# Patient Record
Sex: FEMALE | Race: OTHER | URBAN - METROPOLITAN AREA
[De-identification: names, ages, dates, MRNs, and addresses within clinical notes are randomized per-mention and may not be internally consistent; named-entity substitution may affect disease eponyms.]

---

## 2017-05-18 ENCOUNTER — IMPORTED ENCOUNTER (OUTPATIENT)
Dept: URBAN - METROPOLITAN AREA CLINIC 9 | Facility: CLINIC | Age: 72
End: 2017-05-18

## 2017-07-28 NOTE — PATIENT DISCUSSION
Surgery Counseling:  I have discussed the option of glasses versus cataract surgery versus following, It was explained that when vision no longer meets the patient's visual needs and a new prescription for glasses is not likely to improve the patient's visual symptoms, the option of cataract surgery is a reasonable next step. It was explained that there is no guarantee that removing the cataract will improve their visual symptoms; however, it is believed that the cataract is contributing to the patient's visual impairment and surgery may noticeably improve both the visual and functional status of the patient. After this discussion, the patient desires to proceed with cataract surgery with implantation of an intraocular lens to improve their vision for driving and to reduce glare.

## 2017-07-28 NOTE — PATIENT DISCUSSION
CATARACT, OU - VISUALLY SIGNIFICANT. SCHEDULE PHACO WITH IOL OD SET FOR NEAR FIRST THEN OS SET FOR NEAR IF VISUAL SYMPTOMS PERSIST. GLASSES RX GIVEN TO FILL IF DESIRES IN THE EVENT PATIENT DOES NOT PROCEED WITH SURGERY. IF AXIAL LENGTH GREATER THAN 26MM WILL NEED RETINAL CLEARANCE. EXPLAINED INCREASED RISK OF RD WITH LONG AXIAL LENGTH.

## 2017-07-28 NOTE — PATIENT DISCUSSION
DIPLOPIA: SYMPTOMATIC FOR THIS PATIENT. CONTINUE TO WEAR PRISM RX AND TO SEE DR. JIMENEZ AS SCHEDULED.

## 2017-07-28 NOTE — PATIENT DISCUSSION
Diplopia or double vision is caused by misalignment of the eyes. This can be due to many causes. It is often treated with either prism glasses or eye muscle surgery. The alignment must be stable prior to proceeding with eye muscle surgery. The cause of diplopia was discussed with the patient and all their questions were answered.

## 2017-07-28 NOTE — PATIENT DISCUSSION
MILD DRY EYE, OU: CONTINUE ARTIFICIAL TEARS PRN OU. RECOMMENDS OMEGA-3 FISH OIL WITH [facility] APPROVAL. RETURN FOR FOLLOW-UP AS SCHEDULED OR SOONER IF SYMPTOMS WORSEN.

## 2017-08-25 NOTE — PATIENT DISCUSSION
New Prescription: Besivance (besifloxacin): drops,suspension: 0.6% 1 drop three times a day as directed into right eye 08-

## 2017-08-25 NOTE — PATIENT DISCUSSION
CATARACT, OU - VISUALLY SIGNIFICANT. SCHEDULE PHACO WITH IOL OD SET FOR NEAR FIRST THEN OS SET FOR NEAR IF VISUAL SYMPTOMS PERSIST.

## 2017-08-25 NOTE — PATIENT DISCUSSION
New Prescription: Prolensa (bromfenac): drops: 0.07% 1 drop once a day as directed into right eye 08-

## 2017-08-25 NOTE — PATIENT DISCUSSION
New Prescription: prednisolone acetate (prednisolone acetate): drops,suspension: 1% 1 drop three times a day as directed into right eye 08-

## 2017-09-20 NOTE — PATIENT DISCUSSION
Continue: Besivance (besifloxacin): drops,suspension: 0.6% 1 drop three times a day as directed into left eye 08-

## 2017-09-20 NOTE — PATIENT DISCUSSION
Continue: prednisolone acetate (prednisolone acetate): drops,suspension: 1% 1 drop three times a day as directed into both eyes 08-

## 2017-09-20 NOTE — PATIENT DISCUSSION
S/P PC IOL, OS: GOOD POST OP RESULT. STOP ANTIBIOTIC DROP IN ONE WEEK. CONTINUE OTHER DROPS AS DIRECTED UNTIL FURTHER INSTRUCTION. RETURN FOR FOLLOW-UP IN 2 WEEKS. GIVEN A TEMPORARY LENS FOR OS. UPDATE PRISM WHEN HE SEES DR. JIMENEZ IN APROX 2-3 WEEKS.

## 2017-10-05 NOTE — PATIENT DISCUSSION
Post-Op Instructions OS: Pred Forte (Prednisolone) 2 times per day for 1 week, then reduce to 1 time per day for 1 week, then discontinue. Prolensa (Bromfenac) 1 time per day for 2 weeks then discontinue.

## 2017-10-05 NOTE — PATIENT DISCUSSION
S/P PE IOL, OU:  DOING WELL. CONTINUE DROPS AS DIRECTED. SPECS RX OFFERED. RX ARC IN SPECS TO MINIMIZE GLARE. RETURN FOR FOLLOW-UP AS SCHEDULED. PATIENT HAS APPT WITH DR Evelina Hunter FOR PRISM RX IN NOVEMBER - NO DIPLOPIA NOTED TODAY, SO ADVISED HIM TO CANCEL APPT WITH DR. JIMENEZ IF ASYMPTOMATIC AFTER GETTING NEW GLASSES MADE.

## 2018-05-21 ENCOUNTER — IMPORTED ENCOUNTER (OUTPATIENT)
Dept: URBAN - METROPOLITAN AREA CLINIC 9 | Facility: CLINIC | Age: 73
End: 2018-05-21

## 2018-07-24 NOTE — PATIENT DISCUSSION
Epiretinal Membrane Counseling: The diagnosis and natural history of epiretinal membrane was discussed with the patient including the risk of progression with retinal traction resulting in visual distortion. Patient instructed on how to do [address] to check for distortion in vision, The patient is instructed to call back immediately if any vision changes, and keep follow up as scheduled.

## 2018-07-24 NOTE — PATIENT DISCUSSION
POSTERIOR CAPSULAR FIBROSIS, OU:  VISUALLY SIGNIFICANT. SCHEDULE YAG CAPSULOTOMY ODFIRST THEN LATER YAG CAPSULOTOMY OS IF VISUAL SYMPTOMS PERSIST.

## 2018-09-18 NOTE — PATIENT DISCUSSION
OD 1st / Symfony OD Nataliya / Symfony OS -0.50 to -0.75 /TMF / Custom Nataliya OU/ B + Nataliya OU.

## 2018-09-18 NOTE — PATIENT DISCUSSION
Start Valtrex 1 day before surgery and 9 days after surgery for each eye. Pt has Valtrex rx on hand.

## 2018-10-18 NOTE — PATIENT DISCUSSION
1 week PO: Patient is doing well post-operatively. The importance of post-op drop compliance was emphasized. Drop scheduled reviewed with patient. Patient to call if any visual changes or concerns. MD at bedside.

## 2019-05-23 ENCOUNTER — IMPORTED ENCOUNTER (OUTPATIENT)
Dept: URBAN - METROPOLITAN AREA CLINIC 9 | Facility: CLINIC | Age: 74
End: 2019-05-23

## 2020-03-06 NOTE — PATIENT DISCUSSION
Epiretinal Membrane Counseling: The diagnosis and natural history of epiretinal membrane was discussed with the patient including the risk of progression with retinal traction resulting in visual distortion. Patient instructed on how to do [address] to check for distortion in vision, The patient is instructed to call back immediately if any vision changes, and keep follow up as scheduled.

## 2020-03-06 NOTE — PATIENT DISCUSSION
*PCF OS: BECOMING VISUALLY SIGNIFICANT BUT NOT BOTHERSOME TO PATIENT AT THIS TIME. CONTINUE TO FOLLOW FOR NOW. OFFER SPEC RX UPDATE.

## 2020-03-06 NOTE — PATIENT DISCUSSION
Exotropia Counseling:  Exotropia is a type of strabismus where the eyes turn outward. This can be one eye or both eyes. The eye turn may be constant or it may come and go. Treatment may include eye glasses or eye muscle surgery. An instructional handout is available that describes the diagnosis, treatment plan and duration of treatment. All questions were answered today in clinic.

## 2020-07-02 ENCOUNTER — IMPORTED ENCOUNTER (OUTPATIENT)
Dept: URBAN - METROPOLITAN AREA CLINIC 9 | Facility: CLINIC | Age: 75
End: 2020-07-02

## 2020-11-12 NOTE — PATIENT DISCUSSION
Start Valtrex 1 day before surgery and 9 days after surgery for each eye. Pt has Valtrex rx on hand. TIA (transient ischemic attack)

## 2021-06-09 NOTE — PATIENT DISCUSSION
Epiretinal Membrane Counseling: The diagnosis and natural history of epiretinal membrane was discussed with the patient including the risk of progression with retinal traction resulting in visual distortion. Patient instructed on how to do [address] to check for distortion in vision, The patient is instructed to call back immediately if any vision changes, and keep follow up as scheduled.

## 2021-07-29 ENCOUNTER — IMPORTED ENCOUNTER (OUTPATIENT)
Dept: URBAN - METROPOLITAN AREA CLINIC 9 | Facility: CLINIC | Age: 76
End: 2021-07-29

## 2021-07-29 PROBLEM — INACTIVE: Noted: 2021-07-29

## 2021-07-29 PROBLEM — D31.31: Noted: 2021-07-29

## 2021-07-29 PROBLEM — D31.32: Noted: 2021-07-29

## 2021-07-29 PROBLEM — H25.13: Noted: 2021-07-29

## 2021-10-16 ASSESSMENT — VISUAL ACUITY
OD_SC: 20/50 SN
OS_CC: 20/20 SN
OD_SC: 20/50 - SN
OS_CC: 20/25 - SN
OS_SC: 20/40 - SN
OS_SC: 20/25 - SN
OS_SC: 20/30 SN
OD_CC: 20/25 SN
OD_CC: 20/25 SN
OS_CC: 20/25 SN
OS_CC: 14.0
OS_SC: 20/25 SN
OD_CC: 20/25 SN
OD_CC: 14.0
OS_CC: 20/30 SN
OD_CC: 20/20 SN
OD_CC: 20/25 SN
OD_SC: 20/40 -2 SN
OS_CC: 20/30 SN
OD_SC: 20/50 + SN

## 2021-10-16 ASSESSMENT — KERATOMETRY
OS_K1POWER_DIOPTERS: 43.25
OD_AXISANGLE2_DEGREES: 103
OD_AXISANGLE2_DEGREES: 107
OS_AXISANGLE2_DEGREES: 67
OS_AXISANGLE2_DEGREES: 78
OD_AXISANGLE2_DEGREES: 111
OD_AXISANGLE_DEGREES: 17
OD_K2POWER_DIOPTERS: 43.75
OS_K2POWER_DIOPTERS: 44.5
OD_AXISANGLE_DEGREES: 13
OD_K2POWER_DIOPTERS: 44
OS_K1POWER_DIOPTERS: 43.25
OS_K2POWER_DIOPTERS: 44.5
OS_K1POWER_DIOPTERS: 43
OD_K2POWER_DIOPTERS: 44.25
OD_K1POWER_DIOPTERS: 43.25
OD_AXISANGLE_DEGREES: 21
OS_AXISANGLE_DEGREES: 165
OS_K2POWER_DIOPTERS: 44
OS_AXISANGLE_DEGREES: 157
OD_K1POWER_DIOPTERS: 43
OD_K1POWER_DIOPTERS: 43.25
OS_AXISANGLE2_DEGREES: 75
OS_AXISANGLE_DEGREES: 168

## 2021-10-16 ASSESSMENT — TONOMETRY
OD_IOP_MMHG: 14
OS_IOP_MMHG: 17
OS_IOP_MMHG: 15
OS_IOP_MMHG: 17
OD_IOP_MMHG: 20
OD_IOP_MMHG: 16
OS_IOP_MMHG: 12
OD_IOP_MMHG: 17
OD_IOP_MMHG: 17
OS_IOP_MMHG: 17

## 2022-06-29 RX ORDER — PANTOPRAZOLE SODIUM 20 MG/1
TABLET, DELAYED RELEASE ORAL
COMMUNITY

## 2022-06-29 RX ORDER — IBUPROFEN 800 MG/1
TABLET ORAL
COMMUNITY

## 2022-08-08 ENCOUNTER — COMPREHENSIVE EXAM (OUTPATIENT)
Dept: URBAN - NONMETROPOLITAN AREA CLINIC 6 | Facility: CLINIC | Age: 77
End: 2022-08-08

## 2022-08-08 DIAGNOSIS — H25.13: ICD-10-CM

## 2022-08-08 DIAGNOSIS — D31.32: ICD-10-CM

## 2022-08-08 DIAGNOSIS — D31.31: ICD-10-CM

## 2022-08-08 DIAGNOSIS — H43.812: ICD-10-CM

## 2022-08-08 DIAGNOSIS — H04.123: ICD-10-CM

## 2022-08-08 PROCEDURE — 92015 DETERMINE REFRACTIVE STATE: CPT

## 2022-08-08 PROCEDURE — 92250 FUNDUS PHOTOGRAPHY W/I&R: CPT

## 2022-08-08 PROCEDURE — 92014 COMPRE OPH EXAM EST PT 1/>: CPT

## 2022-08-08 ASSESSMENT — VISUAL ACUITY
OU_SC: J3
OD_GLARE: 20/400
OU_CC: 20/30-2
OD_SC: 20/40
OU_SC: 20/40-1
OS_CC: 20/70
OS_SC: 20/100-1
OD_CC: 20/40

## 2022-08-08 ASSESSMENT — KERATOMETRY
OD_K2POWER_DIOPTERS: 43.75
OS_K2POWER_DIOPTERS: 44.00
OD_K1POWER_DIOPTERS: 43.25
OD_AXISANGLE2_DEGREES: 114
OS_AXISANGLE2_DEGREES: 71
OS_K1POWER_DIOPTERS: 43.25
OS_AXISANGLE_DEGREES: 161
OD_AXISANGLE_DEGREES: 24

## 2022-08-08 ASSESSMENT — TONOMETRY
OS_IOP_MMHG: 20
OD_IOP_MMHG: 21

## 2022-09-07 ENCOUNTER — PRE-OP/H&P (OUTPATIENT)
Dept: URBAN - NONMETROPOLITAN AREA CLINIC 6 | Facility: CLINIC | Age: 77
End: 2022-09-07

## 2022-09-07 DIAGNOSIS — H25.13: ICD-10-CM

## 2022-09-07 PROCEDURE — 92136 OPHTHALMIC BIOMETRY: CPT

## 2022-09-07 ASSESSMENT — KERATOMETRY
OS_K1POWER_DIOPTERS: 43.25
OD_K1POWER_DIOPTERS: 43.25
OS_AXISANGLE_DEGREES: 161
OS_K2POWER_DIOPTERS: 44.00
OD_AXISANGLE2_DEGREES: 114
OS_AXISANGLE2_DEGREES: 71
OD_AXISANGLE_DEGREES: 24
OD_K2POWER_DIOPTERS: 43.75

## 2022-11-17 ENCOUNTER — POST-OP (OUTPATIENT)
Dept: URBAN - NONMETROPOLITAN AREA CLINIC 6 | Facility: CLINIC | Age: 77
End: 2022-11-17

## 2022-11-17 DIAGNOSIS — Z96.1: ICD-10-CM

## 2022-11-17 PROCEDURE — 99024 POSTOP FOLLOW-UP VISIT: CPT

## 2022-11-17 ASSESSMENT — TONOMETRY: OS_IOP_MMHG: 14

## 2022-11-17 ASSESSMENT — VISUAL ACUITY: OS_SC: 20/30-1

## 2022-11-22 ENCOUNTER — POST-OP (OUTPATIENT)
Dept: URBAN - NONMETROPOLITAN AREA CLINIC 6 | Facility: CLINIC | Age: 77
End: 2022-11-22

## 2022-11-22 DIAGNOSIS — H25.11: ICD-10-CM

## 2022-11-22 DIAGNOSIS — H25.12: ICD-10-CM

## 2022-11-22 DIAGNOSIS — Z96.1: ICD-10-CM

## 2022-11-22 PROCEDURE — 99024 POSTOP FOLLOW-UP VISIT: CPT

## 2022-11-22 PROCEDURE — 92136 OPHTHALMIC BIOMETRY: CPT

## 2022-11-22 ASSESSMENT — KERATOMETRY
OS_AXISANGLE2_DEGREES: 81
OS_K2POWER_DIOPTERS: 43.50
OS_AXISANGLE_DEGREES: 171
OS_K1POWER_DIOPTERS: 43.00

## 2022-11-22 ASSESSMENT — TONOMETRY: OS_IOP_MMHG: 15

## 2022-11-22 ASSESSMENT — VISUAL ACUITY: OS_SC: 20/25

## 2022-12-01 ENCOUNTER — POST-OP (OUTPATIENT)
Dept: URBAN - NONMETROPOLITAN AREA CLINIC 6 | Facility: CLINIC | Age: 77
End: 2022-12-01

## 2022-12-01 PROCEDURE — 99024 POSTOP FOLLOW-UP VISIT: CPT

## 2022-12-01 ASSESSMENT — TONOMETRY: OD_IOP_MMHG: 13

## 2022-12-01 ASSESSMENT — VISUAL ACUITY: OD_SC: 20/40

## 2022-12-12 ENCOUNTER — POST-OP (OUTPATIENT)
Dept: URBAN - NONMETROPOLITAN AREA CLINIC 6 | Facility: CLINIC | Age: 77
End: 2022-12-12

## 2022-12-12 PROCEDURE — 99024 POSTOP FOLLOW-UP VISIT: CPT

## 2022-12-12 ASSESSMENT — KERATOMETRY
OD_K1POWER_DIOPTERS: 43.00
OD_AXISANGLE2_DEGREES: 124
OD_AXISANGLE_DEGREES: 34
OD_K2POWER_DIOPTERS: 43.75

## 2022-12-12 ASSESSMENT — TONOMETRY
OS_IOP_MMHG: 15
OD_IOP_MMHG: 13

## 2022-12-12 ASSESSMENT — VISUAL ACUITY
OU_SC: 20/30-2
OD_SC: 20/30-2
OS_SC: 20/40-2

## 2024-02-19 ENCOUNTER — COMPREHENSIVE EXAM (OUTPATIENT)
Dept: URBAN - NONMETROPOLITAN AREA CLINIC 6 | Facility: CLINIC | Age: 79
End: 2024-02-19

## 2024-02-19 DIAGNOSIS — H43.812: ICD-10-CM

## 2024-02-19 DIAGNOSIS — H26.493: ICD-10-CM

## 2024-02-19 DIAGNOSIS — H04.123: ICD-10-CM

## 2024-02-19 DIAGNOSIS — D31.32: ICD-10-CM

## 2024-02-19 DIAGNOSIS — D31.31: ICD-10-CM

## 2024-02-19 PROCEDURE — 92015 DETERMINE REFRACTIVE STATE: CPT

## 2024-02-19 PROCEDURE — 92014 COMPRE OPH EXAM EST PT 1/>: CPT

## 2024-02-19 PROCEDURE — 66821 AFTER CATARACT LASER SURGERY: CPT

## 2024-02-19 ASSESSMENT — KERATOMETRY
OS_AXISANGLE2_DEGREES: 76
OD_AXISANGLE_DEGREES: 34
OD_AXISANGLE_DEGREES: 30
OD_K1POWER_DIOPTERS: 43.00
OD_AXISANGLE2_DEGREES: 124
OS_K1POWER_DIOPTERS: 43.50
OD_K1POWER_DIOPTERS: 43.25
OD_AXISANGLE2_DEGREES: 120
OS_K2POWER_DIOPTERS: 44.50
OD_K2POWER_DIOPTERS: 43.75
OS_AXISANGLE_DEGREES: 166
OD_K2POWER_DIOPTERS: 44.00

## 2024-02-19 ASSESSMENT — VISUAL ACUITY
OS_CC: 20/20
OD_SC: 20/30-1
OS_GLARE: 20/50
OS_SC: 20/30-1
OD_GLARE: 20/50
OD_CC: 20/20

## 2024-02-19 ASSESSMENT — TONOMETRY
OD_IOP_MMHG: 9
OS_IOP_MMHG: 9

## 2024-03-18 ENCOUNTER — POST-OP (OUTPATIENT)
Dept: URBAN - NONMETROPOLITAN AREA CLINIC 6 | Facility: CLINIC | Age: 79
End: 2024-03-18

## 2024-03-18 DIAGNOSIS — Z98.890: ICD-10-CM

## 2024-03-18 PROCEDURE — 92015 DETERMINE REFRACTIVE STATE: CPT

## 2024-03-18 PROCEDURE — 99024 POSTOP FOLLOW-UP VISIT: CPT

## 2024-03-18 ASSESSMENT — KERATOMETRY
OD_K2POWER_DIOPTERS: 44.00
OS_K2POWER_DIOPTERS: 44.50
OD_AXISANGLE2_DEGREES: 124
OD_K1POWER_DIOPTERS: 43.00
OS_AXISANGLE2_DEGREES: 76
OS_K1POWER_DIOPTERS: 43.50
OD_K2POWER_DIOPTERS: 43.75
OD_AXISANGLE_DEGREES: 30
OS_AXISANGLE_DEGREES: 166
OD_K1POWER_DIOPTERS: 43.25
OD_AXISANGLE_DEGREES: 34
OD_AXISANGLE2_DEGREES: 120

## 2024-03-18 ASSESSMENT — VISUAL ACUITY
OD_SC: 20/30
OS_SC: 20/20-2

## 2024-03-18 ASSESSMENT — TONOMETRY
OD_IOP_MMHG: 14
OS_IOP_MMHG: 14

## 2024-08-23 NOTE — PATIENT DISCUSSION
Discussed condition and exacerbating conditions/situations (e.g., dry/arid environments, overhead fans, air conditioners, side effect of medications). LEFT VM FOR NEW LOCATION